# Patient Record
Sex: FEMALE | Race: BLACK OR AFRICAN AMERICAN | URBAN - METROPOLITAN AREA
[De-identification: names, ages, dates, MRNs, and addresses within clinical notes are randomized per-mention and may not be internally consistent; named-entity substitution may affect disease eponyms.]

---

## 2017-01-20 ENCOUNTER — HOSPITAL ENCOUNTER (OUTPATIENT)
Dept: MEDSURG UNIT | Facility: HOSPITAL | Age: 67
End: 2017-01-22
Attending: FAMILY MEDICINE | Admitting: FAMILY MEDICINE

## 2017-01-23 ENCOUNTER — HISTORICAL (OUTPATIENT)
Dept: LAB | Facility: HOSPITAL | Age: 67
End: 2017-01-23

## 2017-03-28 ENCOUNTER — HISTORICAL (OUTPATIENT)
Dept: FAMILY MEDICINE | Facility: CLINIC | Age: 67
End: 2017-03-28

## 2017-03-30 ENCOUNTER — HISTORICAL (OUTPATIENT)
Dept: ADMINISTRATIVE | Facility: HOSPITAL | Age: 67
End: 2017-03-30

## 2017-04-27 ENCOUNTER — HISTORICAL (OUTPATIENT)
Dept: LAB | Facility: HOSPITAL | Age: 67
End: 2017-04-27

## 2017-06-19 ENCOUNTER — HISTORICAL (OUTPATIENT)
Dept: LAB | Facility: HOSPITAL | Age: 67
End: 2017-06-19

## 2017-06-25 LAB — FINAL CULTURE: NORMAL

## 2017-07-11 ENCOUNTER — HISTORICAL (OUTPATIENT)
Dept: ADMINISTRATIVE | Facility: HOSPITAL | Age: 67
End: 2017-07-11

## 2017-07-11 LAB
BUN SERPL-MCNC: 63 MG/DL (ref 7–18)
CALCIUM SERPL-MCNC: 8.6 MG/DL (ref 8.5–10.1)
CHLORIDE SERPL-SCNC: 104 MMOL/L (ref 98–107)
CO2 SERPL-SCNC: 25 MMOL/L (ref 21–32)
CREAT SERPL-MCNC: 3.76 MG/DL (ref 0.55–1.02)
GLUCOSE SERPL-MCNC: 126 MG/DL (ref 74–106)
POTASSIUM SERPL-SCNC: 4.5 MMOL/L (ref 3.5–5.1)
SODIUM SERPL-SCNC: 138 MMOL/L (ref 136–145)

## 2017-07-17 ENCOUNTER — HISTORICAL (OUTPATIENT)
Dept: INTENSIVE CARE | Facility: HOSPITAL | Age: 67
End: 2017-07-17

## 2017-07-17 LAB
ABS NEUT (OLG): 6.57 X10(3)/MCL (ref 2.1–9.2)
APTT PPP: 32.2 SECOND(S) (ref 20.6–36)
BUN SERPL-MCNC: 64 MG/DL (ref 7–18)
CALCIUM SERPL-MCNC: 8.7 MG/DL (ref 8.5–10.1)
CHLORIDE SERPL-SCNC: 102 MMOL/L (ref 98–107)
CO2 SERPL-SCNC: 20 MMOL/L (ref 21–32)
CREAT SERPL-MCNC: 4.11 MG/DL (ref 0.55–1.02)
ERYTHROCYTE [DISTWIDTH] IN BLOOD BY AUTOMATED COUNT: 16.9 % (ref 11.5–17)
GLUCOSE SERPL-MCNC: 272 MG/DL (ref 74–106)
HCT VFR BLD AUTO: 30.1 % (ref 37–47)
HGB BLD-MCNC: 10.3 GM/DL (ref 12–16)
INR PPP: 1.55 (ref 0–1.27)
LYMPHOCYTES # BLD AUTO: 0.3 X10(3)/MCL (ref 0.6–4.6)
LYMPHOCYTES NFR BLD AUTO: 4 %
MCH RBC QN AUTO: 27.3 PG (ref 27–31)
MCHC RBC AUTO-ENTMCNC: 34.2 GM/DL (ref 33–36)
MCV RBC AUTO: 79.8 FL (ref 80–94)
MONOCYTES # BLD AUTO: 0.1 X10(3)/MCL (ref 0.1–1.3)
MONOCYTES NFR BLD AUTO: 1 %
NEUTROPHILS # BLD AUTO: 6.57 X10(3)/MCL (ref 2.1–9.2)
NEUTROPHILS NFR BLD AUTO: 94 %
PLATELET # BLD AUTO: 167 X10(3)/MCL (ref 130–400)
PMV BLD AUTO: 11.7 FL (ref 9.4–12.4)
POTASSIUM SERPL-SCNC: 4.4 MMOL/L (ref 3.5–5.1)
PROTHROMBIN TIME: 18.3 SECOND(S) (ref 12.1–14.2)
RBC # BLD AUTO: 3.77 X10(6)/MCL (ref 4.2–5.4)
SODIUM SERPL-SCNC: 141 MMOL/L (ref 136–145)
WBC # SPEC AUTO: 7 X10(3)/MCL (ref 4.5–11.5)

## 2017-07-18 LAB
ABS NEUT (OLG): 7.08 X10(3)/MCL (ref 2.1–9.2)
BUN SERPL-MCNC: 67 MG/DL (ref 7–18)
CALCIUM SERPL-MCNC: 8 MG/DL (ref 8.5–10.1)
CHLORIDE SERPL-SCNC: 105 MMOL/L (ref 98–107)
CO2 SERPL-SCNC: 22 MMOL/L (ref 21–32)
CREAT SERPL-MCNC: 3.95 MG/DL (ref 0.55–1.02)
ERYTHROCYTE [DISTWIDTH] IN BLOOD BY AUTOMATED COUNT: 16.8 % (ref 11.5–17)
GLUCOSE SERPL-MCNC: 249 MG/DL (ref 74–106)
HCT VFR BLD AUTO: 28.6 % (ref 37–47)
HGB BLD-MCNC: 9.8 GM/DL (ref 12–16)
LYMPHOCYTES # BLD AUTO: 0.3 X10(3)/MCL (ref 0.6–4.6)
LYMPHOCYTES NFR BLD AUTO: 4 %
MCH RBC QN AUTO: 27.1 PG (ref 27–31)
MCHC RBC AUTO-ENTMCNC: 34.3 GM/DL (ref 33–36)
MCV RBC AUTO: 79.2 FL (ref 80–94)
MONOCYTES # BLD AUTO: 0.2 X10(3)/MCL (ref 0.1–1.3)
MONOCYTES NFR BLD AUTO: 3 %
NEUTROPHILS # BLD AUTO: 7.08 X10(3)/MCL (ref 2.1–9.2)
NEUTROPHILS NFR BLD AUTO: 92 %
PLATELET # BLD AUTO: 179 X10(3)/MCL (ref 130–400)
PMV BLD AUTO: 12.7 FL (ref 9.4–12.4)
POTASSIUM SERPL-SCNC: 4.1 MMOL/L (ref 3.5–5.1)
RBC # BLD AUTO: 3.61 X10(6)/MCL (ref 4.2–5.4)
SODIUM SERPL-SCNC: 140 MMOL/L (ref 136–145)
WBC # SPEC AUTO: 7.6 X10(3)/MCL (ref 4.5–11.5)

## 2017-08-15 ENCOUNTER — HISTORICAL (OUTPATIENT)
Dept: ADMINISTRATIVE | Facility: HOSPITAL | Age: 67
End: 2017-08-15

## 2017-11-01 ENCOUNTER — HISTORICAL (OUTPATIENT)
Dept: ADMINISTRATIVE | Facility: HOSPITAL | Age: 67
End: 2017-11-01

## 2017-11-01 LAB
ABS NEUT (OLG): 4.4 X10(3)/MCL (ref 2.1–9.2)
BASOPHILS # BLD AUTO: 0 X10(3)/MCL (ref 0–0.2)
BASOPHILS NFR BLD AUTO: 1 %
BUN SERPL-MCNC: 48 MG/DL (ref 7–18)
CALCIUM SERPL-MCNC: 8 MG/DL (ref 8.5–10.1)
CHLORIDE SERPL-SCNC: 109 MMOL/L (ref 98–107)
CO2 SERPL-SCNC: 18 MMOL/L (ref 21–32)
CREAT SERPL-MCNC: 6.05 MG/DL (ref 0.55–1.02)
EOSINOPHIL # BLD AUTO: 0.1 X10(3)/MCL (ref 0–0.9)
EOSINOPHIL NFR BLD AUTO: 1 %
ERYTHROCYTE [DISTWIDTH] IN BLOOD BY AUTOMATED COUNT: 18.5 % (ref 11.5–17)
GLUCOSE SERPL-MCNC: 178 MG/DL (ref 74–106)
GROUP & RH: NORMAL
HCT VFR BLD AUTO: 28.5 % (ref 37–47)
HGB BLD-MCNC: 9.4 GM/DL (ref 12–16)
LYMPHOCYTES # BLD AUTO: 1 X10(3)/MCL (ref 0.6–4.6)
LYMPHOCYTES NFR BLD AUTO: 17 %
MCH RBC QN AUTO: 26.7 PG (ref 27–31)
MCHC RBC AUTO-ENTMCNC: 33 GM/DL (ref 33–36)
MCV RBC AUTO: 81 FL (ref 80–94)
MONOCYTES # BLD AUTO: 0.6 X10(3)/MCL (ref 0.1–1.3)
MONOCYTES NFR BLD AUTO: 10 %
NEUTROPHILS # BLD AUTO: 4.4 X10(3)/MCL (ref 1.4–7.9)
NEUTROPHILS NFR BLD AUTO: 71 %
PLATELET # BLD AUTO: 160 X10(3)/MCL (ref 130–400)
PMV BLD AUTO: 10.1 FL (ref 9.4–12.4)
POTASSIUM SERPL-SCNC: 5.3 MMOL/L (ref 3.5–5.1)
RBC # BLD AUTO: 3.52 X10(6)/MCL (ref 4.2–5.4)
SODIUM SERPL-SCNC: 141 MMOL/L (ref 136–145)
WBC # SPEC AUTO: 6.2 X10(3)/MCL (ref 4.5–11.5)

## 2017-11-15 ENCOUNTER — HISTORICAL (OUTPATIENT)
Dept: ADMINISTRATIVE | Facility: HOSPITAL | Age: 67
End: 2017-11-15

## 2017-11-15 LAB
BUN SERPL-MCNC: 21 MG/DL (ref 7–18)
CALCIUM SERPL-MCNC: 8.5 MG/DL (ref 8.5–10.1)
CHLORIDE SERPL-SCNC: 102 MMOL/L (ref 98–107)
CO2 SERPL-SCNC: 20 MMOL/L (ref 21–32)
CREAT SERPL-MCNC: 3.64 MG/DL (ref 0.55–1.02)
GLUCOSE SERPL-MCNC: 145 MG/DL (ref 74–106)
POTASSIUM SERPL-SCNC: 4.8 MMOL/L (ref 3.5–5.1)
SODIUM SERPL-SCNC: 134 MMOL/L (ref 136–145)

## 2017-11-24 ENCOUNTER — HISTORICAL (OUTPATIENT)
Dept: ADMINISTRATIVE | Facility: HOSPITAL | Age: 67
End: 2017-11-24

## 2017-11-24 LAB
ABS NEUT (OLG): 4.79
BASOPHILS # BLD AUTO: 0.02 X10(3)/MCL
BASOPHILS NFR BLD AUTO: 0.3 %
EOSINOPHIL # BLD AUTO: 0.14 X10(3)/MCL
EOSINOPHIL NFR BLD AUTO: 2 %
ERYTHROCYTE [DISTWIDTH] IN BLOOD BY AUTOMATED COUNT: 18 %
HCT VFR BLD AUTO: 29 % (ref 34–46)
HGB BLD-MCNC: 9.8 GM/DL (ref 11.3–15.4)
IMM GRANULOCYTES # BLD AUTO: 0.01 10*3/UL (ref 0–0.1)
IMM GRANULOCYTES NFR BLD AUTO: 0.1 % (ref 0–1)
LYMPHOCYTES # BLD AUTO: 1.15 X10(3)/MCL
LYMPHOCYTES NFR BLD AUTO: 16.4 %
MCH RBC QN AUTO: 26.4 PG (ref 27–33)
MCHC RBC AUTO-ENTMCNC: 33.8 GM/DL (ref 32–35)
MCV RBC AUTO: 78.2 FL (ref 81–97)
MONOCYTES # BLD AUTO: 0.89 X10(3)/MCL
MONOCYTES NFR BLD AUTO: 12.7 %
NEUTROPHILS # BLD AUTO: 4.79 X10(3)/MCL
NEUTROPHILS NFR BLD AUTO: 68.5 %
PLATELET # BLD AUTO: 210 X10(3)/MCL (ref 151–368)
PMV BLD AUTO: 11 FL
RBC # BLD AUTO: 3.71 X10(6)/MCL (ref 3.9–5)
WBC # SPEC AUTO: 7 X10(3)/MCL (ref 3.4–9.2)

## 2017-12-14 ENCOUNTER — HISTORICAL (OUTPATIENT)
Dept: LAB | Facility: HOSPITAL | Age: 67
End: 2017-12-14

## 2017-12-14 LAB
ABS NEUT (OLG): 4.26 X10(3)/MCL (ref 2.1–9.2)
ALBUMIN SERPL-MCNC: 3.2 GM/DL (ref 3.4–5)
ALBUMIN/GLOB SERPL: 0.8 {RATIO}
ALP SERPL-CCNC: 134 UNIT/L (ref 38–126)
ALT SERPL-CCNC: 12 UNIT/L (ref 12–78)
AST SERPL-CCNC: 16 UNIT/L (ref 15–37)
BASOPHILS # BLD AUTO: 0 X10(3)/MCL (ref 0–0.2)
BASOPHILS NFR BLD AUTO: 0 %
BILIRUB SERPL-MCNC: 1.7 MG/DL (ref 0.2–1)
BILIRUBIN DIRECT+TOT PNL SERPL-MCNC: 0.8 MG/DL (ref 0–0.2)
BILIRUBIN DIRECT+TOT PNL SERPL-MCNC: 0.9 MG/DL (ref 0–0.8)
BUN SERPL-MCNC: 5 MG/DL (ref 7–18)
CALCIUM SERPL-MCNC: 8.5 MG/DL (ref 8.5–10.1)
CHLORIDE SERPL-SCNC: 105 MMOL/L (ref 98–107)
CHOLEST SERPL-MCNC: 100 MG/DL (ref 0–200)
CHOLEST/HDLC SERPL: 1.4 {RATIO} (ref 0–4)
CO2 SERPL-SCNC: 23 MMOL/L (ref 21–32)
CREAT SERPL-MCNC: 2.32 MG/DL (ref 0.55–1.02)
DEPRECATED CALCIDIOL+CALCIFEROL SERPL-MC: 39.73 NG/ML (ref 30–80)
EOSINOPHIL # BLD AUTO: 0.1 X10(3)/MCL (ref 0–0.9)
EOSINOPHIL NFR BLD AUTO: 2 %
ERYTHROCYTE [DISTWIDTH] IN BLOOD BY AUTOMATED COUNT: 19.2 % (ref 11.5–17)
EST. AVERAGE GLUCOSE BLD GHB EST-MCNC: 163 MG/DL
GLOBULIN SER-MCNC: 3.9 GM/DL (ref 2.4–3.5)
GLUCOSE SERPL-MCNC: 80 MG/DL (ref 74–106)
HBA1C MFR BLD: 7.3 % (ref 4.2–6.3)
HCT VFR BLD AUTO: 34.9 % (ref 37–47)
HDLC SERPL-MCNC: 69 MG/DL (ref 35–60)
HGB BLD-MCNC: 11.5 GM/DL (ref 12–16)
IRON SATN MFR SERPL: 56.1 % (ref 20–50)
IRON SERPL-MCNC: 111 MCG/DL (ref 50–175)
LDLC SERPL CALC-MCNC: 17 MG/DL (ref 0–129)
LYMPHOCYTES # BLD AUTO: 1 X10(3)/MCL (ref 0.6–4.6)
LYMPHOCYTES NFR BLD AUTO: 16 %
MCH RBC QN AUTO: 26 PG (ref 27–31)
MCHC RBC AUTO-ENTMCNC: 33 GM/DL (ref 33–36)
MCV RBC AUTO: 78.8 FL (ref 80–94)
MONOCYTES # BLD AUTO: 0.6 X10(3)/MCL (ref 0.1–1.3)
MONOCYTES NFR BLD AUTO: 10 %
NEUTROPHILS # BLD AUTO: 4.26 X10(3)/MCL (ref 2.1–9.2)
NEUTROPHILS NFR BLD AUTO: 72 %
PLATELET # BLD AUTO: 197 X10(3)/MCL (ref 130–400)
PMV BLD AUTO: 9.5 FL (ref 9.4–12.4)
POTASSIUM SERPL-SCNC: 3.3 MMOL/L (ref 3.5–5.1)
PROT SERPL-MCNC: 7.1 GM/DL (ref 6.4–8.2)
RBC # BLD AUTO: 4.43 X10(6)/MCL (ref 4.2–5.4)
SODIUM SERPL-SCNC: 137 MMOL/L (ref 136–145)
TIBC SERPL-MCNC: 198 MCG/DL (ref 250–450)
TRANSFERRIN SERPL-MCNC: 172 MG/DL (ref 200–360)
TRIGL SERPL-MCNC: 71 MG/DL (ref 30–150)
TSH SERPL-ACNC: 1.57 MIU/ML (ref 0.36–3.74)
URATE SERPL-MCNC: 1.7 MG/DL (ref 2.6–7.2)
VLDLC SERPL CALC-MCNC: 14 MG/DL
WBC # SPEC AUTO: 6 X10(3)/MCL (ref 4.5–11.5)

## 2017-12-20 ENCOUNTER — HISTORICAL (OUTPATIENT)
Dept: ADMINISTRATIVE | Facility: HOSPITAL | Age: 67
End: 2017-12-20

## 2017-12-20 LAB
ABS NEUT (OLG): 3.96 X10(3)/MCL (ref 2.1–9.2)
BASOPHILS # BLD AUTO: 0 X10(3)/MCL (ref 0–0.2)
BASOPHILS NFR BLD AUTO: 0 %
BUN SERPL-MCNC: 15 MG/DL (ref 7–18)
CALCIUM SERPL-MCNC: 8.9 MG/DL (ref 8.5–10.1)
CHLORIDE SERPL-SCNC: 108 MMOL/L (ref 98–107)
CO2 SERPL-SCNC: 24 MMOL/L (ref 21–32)
CREAT SERPL-MCNC: 3.79 MG/DL (ref 0.55–1.02)
EOSINOPHIL # BLD AUTO: 0.1 X10(3)/MCL (ref 0–0.9)
EOSINOPHIL NFR BLD AUTO: 2 %
ERYTHROCYTE [DISTWIDTH] IN BLOOD BY AUTOMATED COUNT: 19.6 % (ref 11.5–17)
GLUCOSE SERPL-MCNC: 99 MG/DL (ref 74–106)
HCT VFR BLD AUTO: 36.5 % (ref 37–47)
HGB BLD-MCNC: 12 GM/DL (ref 12–16)
LYMPHOCYTES # BLD AUTO: 1.2 X10(3)/MCL (ref 0.6–4.6)
LYMPHOCYTES NFR BLD AUTO: 20 %
MCH RBC QN AUTO: 26 PG (ref 27–31)
MCHC RBC AUTO-ENTMCNC: 32.9 GM/DL (ref 33–36)
MCV RBC AUTO: 79.2 FL (ref 80–94)
MONOCYTES # BLD AUTO: 0.6 X10(3)/MCL (ref 0.1–1.3)
MONOCYTES NFR BLD AUTO: 10 %
NEUTROPHILS # BLD AUTO: 3.96 X10(3)/MCL (ref 2.1–9.2)
NEUTROPHILS NFR BLD AUTO: 67 %
PLATELET # BLD AUTO: 189 X10(3)/MCL (ref 130–400)
PMV BLD AUTO: 9.8 FL (ref 9.4–12.4)
POTASSIUM SERPL-SCNC: 3.8 MMOL/L (ref 3.5–5.1)
RBC # BLD AUTO: 4.61 X10(6)/MCL (ref 4.2–5.4)
SODIUM SERPL-SCNC: 136 MMOL/L (ref 136–145)
WBC # SPEC AUTO: 5.9 X10(3)/MCL (ref 4.5–11.5)

## 2017-12-26 ENCOUNTER — HISTORICAL (OUTPATIENT)
Dept: MEDSURG UNIT | Facility: HOSPITAL | Age: 67
End: 2017-12-26

## 2017-12-26 LAB
ABS NEUT (OLG): 5.64 X10(3)/MCL (ref 2.1–9.2)
ANION GAP SERPL CALC-SCNC: 17 MMOL/L
APTT PPP: 32.6 SECOND(S) (ref 24.8–36.9)
BASOPHILS # BLD AUTO: 0 X10(3)/MCL (ref 0–0.2)
BASOPHILS NFR BLD AUTO: 0 %
BNP BLD-MCNC: 3872 PG/ML (ref 0–125)
BUN SERPL-MCNC: 44 MG/DL (ref 7–18)
CHLORIDE SERPL-SCNC: 105 MMOL/L (ref 98–109)
CREAT SERPL-MCNC: 6.7 MG/DL (ref 0.6–1.3)
EOSINOPHIL # BLD AUTO: 0.1 X10(3)/MCL (ref 0–0.9)
EOSINOPHIL NFR BLD AUTO: 1 %
ERYTHROCYTE [DISTWIDTH] IN BLOOD BY AUTOMATED COUNT: 18.8 % (ref 11.5–17)
GLUCOSE SERPL-MCNC: 158 MG/DL (ref 70–105)
HCT VFR BLD AUTO: 37 % (ref 37–47)
HCT VFR BLD CALC: 42 % (ref 38–51)
HGB BLD-MCNC: 12.2 GM/DL (ref 12–16)
HGB BLD-MCNC: 14.3 MG/DL (ref 12–17)
INR PPP: 1.2 (ref 0–1.27)
LYMPHOCYTES # BLD AUTO: 1.1 X10(3)/MCL (ref 0.6–4.6)
LYMPHOCYTES NFR BLD AUTO: 14 %
MCH RBC QN AUTO: 26.2 PG (ref 27–31)
MCHC RBC AUTO-ENTMCNC: 33 GM/DL (ref 33–36)
MCV RBC AUTO: 79.6 FL (ref 80–94)
MONOCYTES # BLD AUTO: 0.7 X10(3)/MCL (ref 0.1–1.3)
MONOCYTES NFR BLD AUTO: 9 %
NEUTROPHILS # BLD AUTO: 5.64 X10(3)/MCL (ref 1.4–7.9)
NEUTROPHILS NFR BLD AUTO: 75 %
PLATELET # BLD AUTO: 176 X10(3)/MCL (ref 130–400)
PMV BLD AUTO: 9.7 FL (ref 9.4–12.4)
POC IONIZED CALCIUM: 1.14 MMOL/L (ref 1.12–1.32)
POC TCO2: 23 MMOL/L (ref 22–27)
POTASSIUM BLD-SCNC: 3.6 MMOL/L (ref 3.5–4.9)
PROTHROMBIN TIME: 15.6 SECOND(S) (ref 12.2–14.7)
RBC # BLD AUTO: 4.65 X10(6)/MCL (ref 4.2–5.4)
SODIUM BLD-SCNC: 141 MMOL/L (ref 138–146)
WBC # SPEC AUTO: 7.5 X10(3)/MCL (ref 4.5–11.5)

## 2018-01-12 ENCOUNTER — HISTORICAL (OUTPATIENT)
Dept: SURGERY | Facility: HOSPITAL | Age: 68
End: 2018-01-12

## 2018-01-12 LAB
ABS NEUT (OLG): 4.75 X10(3)/MCL (ref 2.1–9.2)
BASOPHILS # BLD AUTO: 0.03 X10(3)/MCL (ref 0–0.2)
BASOPHILS NFR BLD AUTO: 0.4 % (ref 0–1)
BUN SERPL-MCNC: 56 MG/DL (ref 7–18)
CALCIUM SERPL-MCNC: 8.8 MG/DL (ref 8.5–10.1)
CHLORIDE SERPL-SCNC: 108 MMOL/L (ref 98–107)
CO2 SERPL-SCNC: 19 MMOL/L (ref 21–32)
CREAT SERPL-MCNC: 9.98 MG/DL (ref 0.55–1.02)
EOSINOPHIL # BLD AUTO: 0.1 X10(3)/MCL (ref 0–0.9)
EOSINOPHIL NFR BLD AUTO: 1.4 % (ref 0–6.4)
ERYTHROCYTE [DISTWIDTH] IN BLOOD BY AUTOMATED COUNT: 18.2 % (ref 11.5–17)
GLUCOSE SERPL-MCNC: 182 MG/DL (ref 74–106)
HCT VFR BLD AUTO: 34 % (ref 37–47)
HGB BLD-MCNC: 11.5 GM/DL (ref 12–16)
IMM GRANULOCYTES # BLD AUTO: 0.02 10*3/UL (ref 0–0.02)
IMM GRANULOCYTES NFR BLD AUTO: 0.3 % (ref 0–0.43)
LYMPHOCYTES # BLD AUTO: 1.49 X10(3)/MCL (ref 0.6–4.6)
LYMPHOCYTES NFR BLD AUTO: 21.4 % (ref 16–44)
MCH RBC QN AUTO: 26 PG (ref 27–31)
MCHC RBC AUTO-ENTMCNC: 33.8 GM/DL (ref 33–36)
MCV RBC AUTO: 76.7 FL (ref 80–94)
MONOCYTES # BLD AUTO: 0.56 X10(3)/MCL (ref 0.1–1.3)
MONOCYTES NFR BLD AUTO: 8.1 % (ref 4–12.1)
NEUTROPHILS # BLD AUTO: 4.75 X10(3)/MCL (ref 2.1–9.2)
NEUTROPHILS NFR BLD AUTO: 68.4 % (ref 43–73)
NRBC BLD AUTO-RTO: 0 % (ref 0–0.2)
PLATELET # BLD AUTO: 242 X10(3)/MCL (ref 130–400)
PMV BLD AUTO: 10.6 FL (ref 7.4–10.4)
POTASSIUM SERPL-SCNC: 3.9 MMOL/L (ref 3.5–5.1)
RBC # BLD AUTO: 4.43 X10(6)/MCL (ref 4.2–5.4)
SODIUM SERPL-SCNC: 143 MMOL/L (ref 136–145)
WBC # SPEC AUTO: 7 X10(3)/MCL (ref 4.5–11.5)

## 2018-02-14 ENCOUNTER — HISTORICAL (OUTPATIENT)
Dept: ADMINISTRATIVE | Facility: HOSPITAL | Age: 68
End: 2018-02-14

## 2018-02-14 LAB
ABS NEUT (OLG): 3.39 X10(3)/MCL (ref 2.1–9.2)
BASOPHILS # BLD AUTO: 0 X10(3)/MCL (ref 0–0.2)
BASOPHILS NFR BLD AUTO: 1 %
BUN SERPL-MCNC: 40 MG/DL (ref 7–18)
CALCIUM SERPL-MCNC: 8.2 MG/DL (ref 8.5–10.1)
CHLORIDE SERPL-SCNC: 112 MMOL/L (ref 98–107)
CO2 SERPL-SCNC: 17 MMOL/L (ref 21–32)
CREAT SERPL-MCNC: 5.72 MG/DL (ref 0.55–1.02)
EOSINOPHIL # BLD AUTO: 0.2 X10(3)/MCL (ref 0–0.9)
EOSINOPHIL NFR BLD AUTO: 4 %
ERYTHROCYTE [DISTWIDTH] IN BLOOD BY AUTOMATED COUNT: 16.6 % (ref 11.5–17)
GLUCOSE SERPL-MCNC: 120 MG/DL (ref 74–106)
HCT VFR BLD AUTO: 33.8 % (ref 37–47)
HGB BLD-MCNC: 11.7 GM/DL (ref 12–16)
LYMPHOCYTES # BLD AUTO: 1.1 X10(3)/MCL (ref 0.6–4.6)
LYMPHOCYTES NFR BLD AUTO: 21 %
MCH RBC QN AUTO: 26.4 PG (ref 27–31)
MCHC RBC AUTO-ENTMCNC: 34.6 GM/DL (ref 33–36)
MCV RBC AUTO: 76.1 FL (ref 80–94)
MONOCYTES # BLD AUTO: 0.6 X10(3)/MCL (ref 0.1–1.3)
MONOCYTES NFR BLD AUTO: 12 %
NEUTROPHILS # BLD AUTO: 3.39 X10(3)/MCL (ref 2.1–9.2)
NEUTROPHILS NFR BLD AUTO: 63 %
PLATELET # BLD AUTO: 179 X10(3)/MCL (ref 130–400)
PMV BLD AUTO: 10.1 FL (ref 9.4–12.4)
POTASSIUM SERPL-SCNC: 4.2 MMOL/L (ref 3.5–5.1)
RBC # BLD AUTO: 4.44 X10(6)/MCL (ref 4.2–5.4)
SODIUM SERPL-SCNC: 141 MMOL/L (ref 136–145)
WBC # SPEC AUTO: 5.4 X10(3)/MCL (ref 4.5–11.5)

## 2018-03-28 ENCOUNTER — HISTORICAL (OUTPATIENT)
Dept: LAB | Facility: HOSPITAL | Age: 68
End: 2018-03-28

## 2018-03-31 LAB
FINAL CULTURE: NORMAL
FINAL CULTURE: NORMAL

## 2022-04-30 NOTE — CONSULTS
DATE OF CONSULTATION:  11/15/2017    ATTENDING PHYSICIAN:  Dr. Kelton Recio MD  CONSULTING PHYSICIAN:  Kelton Recio MD    REASON FOR CONSULTATION:  Purulent discharge from chest wall.    HISTORY OF PRESENT ILLNESS:  This is a 66-year-old female, who earlier in October was admitted with anasarca, congestive heart failure, poor urine output despite aggressive medical management for which she underwent placement of temporary groin catheter and was dialyzed for several days in a row removing about 60 pounds of water weight.   On 10/5/17, I inserted a tunneled hemocatheter and she has been since then in hemodialysis three times a week.  Earlier this morning, Dr. Coto repaired this catheter due to poor function.  Yesterday, I was called by the dialysis nurses requesting to have the catheter replaced on the basis of discharge from chest wall at the site of the catheter.    PAST MEDICAL HISTORY:  Positive for Klebsiella pneumoniae, systolic heart failure status post AICD with ejection fraction 23%, diabetes mellitus, obesity, peripheral vascular disease with diabetic foot wounds status post partial amputation in July of 2017, malnutrition, GI bleeding, hemorrhoids, hypothyroidism, thrombocytopenia.    PAST SURGICAL HISTORY:  Cholecystectomy, AICD placement, dialysis catheter insertion.    ALLERGIES:  Iodine.    FAMILY HISTORY:  Noncontributory.    SOCIAL HISTORY:  The patient now is a nursing home resident.  No exposure to alcohol or tobacco.    PHYSICAL EXAMINATION:  GENERAL:   Awake and alert with some confusion in time, but able to identify things around her.     VITAL SIGNS:  Afebrile.     HEENT/NECK: Catheter on right side.   CHEST:  Left infraclavicular implanted device (IACD).  On the right side, there is a partially dislodged catheter with long segment outside of the skin.  There is some purulent discharge that seems to be at the exit site of the catheter where the skin stitches were.     ABDOMEN:   Large and soft.   EXTREMITIES:  Dressings covering the right foot.    LABORATORY DATA:  Sodium 134, potassium 4.8, chloride 102, CO2 20, calcium 8.5, glucose 145, BUN 21 and creatinine 3.6.    IMPRESSION:  Infected hemodialysis catheter partially dislodged.    RECOMMENDATIONS:  Removal of partially dislodged infected catheter and insertion of a new one.    NOTE:  The patient has pacemaker/defibrillator on the left side.  Will try to pass the new catheter on the right side with a different exit site probably close to the sternal border.        ______________________________  Kelton Recio MD JRP/CD  DD:  11/15/2017  Time:  02:19PM  DT:  11/15/2017  Time:  02:51PM  Job #:  [id number]

## 2022-04-30 NOTE — OP NOTE
DATE OF SURGERY:    11/15/2017    SURGEON:  Kelton Recio MD    PREOPERATIVE DIAGNOSIS:  Dislodged, infected hemocatheter.    POSTOPERATIVE DIAGNOSIS:  Dislodged, infected hemocatheter.    ANESTHESIA:  Local plus MAC.    PROCEDURE:    1. Removal of dislodged catheter.  2. Insertion of new Palindrome catheter 23 cm from tip to cuff.    PROCEDURE IN DETAIL:  Upper chest and neck were prepped and draped.  Xylocaine was infiltrated in soft tissues.  Cutdown was down using electrocautery in supraclavicular area.  The catheter was located, observing under x-ray that the tip was just in the innominate vein.  I transected, passed the guide wire and removed the rest of the catheter.  Palindrome catheter 23-cm from tip to cuff was inserted and advanced until reaching the right atrium.  The distal end of the catheter was exteriorized in the right chest, a couple of centimeters lateral to the sternal border, affixed to the skin with two stitches of 2-0 silk.  Lumen of the catheter packed with heparin 1000 unit per mL and a small incision on the neck repaired with Vicryl.        ______________________________  MD MERLENE Friedman/CD  DD:  11/15/2017  Time:  02:22PM  DT:  11/16/2017  Time:  01:49PM  Job #:  [id number]

## 2022-04-30 NOTE — CONSULTS
Patient:   Loida Meng            MRN: [id number]            FIN: [id number]               Age:   67 years     Sex:  Female     :  1950   Associated Diagnoses:   None   Author:   Keven SKELTON MD, Camilo Wen      History of Present Illness   68 y/o female with h/o HTN/HLP/CHF/COPD/ESRD presented to [facility] ED wityh complaint thst her HD cath fell out at home.  She had cath and LUE B-C fistula placed by Dr. Coto and undergoes HD T/Th/Sat.  Denies chest pain or SOB.  Her cath had functioned without issues prior to falling out.      Review of Systems   Constitutional:  No fever, No chills.    Eye:  No recent visual problem.    Ear/Nose/Mouth/Throat:  No decreased hearing.    Respiratory:  No shortness of breath, No cough.    Cardiovascular:  No chest pain.    Gastrointestinal:  No nausea, No vomiting.    Genitourinary:  No dysuria.    Hematology/Lymphatics:  No bruising tendency.    Integumentary:  No rash.    Neurologic:  Alert and oriented X4.       Histories   PMH - HTN, HLP, ESRD, CHF COPD  [facility] - tunneled cath, RUE B-C fistula, AICD  Social - denies tobacco  Family - noncontributory      Physical Examination   WDWN female in NAD  AAO x 4  EOMI  CTA B  RRR, nl S1S2  soft, NT/ND, +BS  RUE fistula with +thill  diminished radial pulse      Impression and Plan   68 y/o female with ESRD  -Pt doing well.  Her tunneled cath fell out at home and requires replacement. Will plan for this in the OR today.  Procedure along with risks discussed.  She plans for HD tomorrow at her center.

## 2022-04-30 NOTE — OP NOTE
Patient:   Loida Meng            MRN: [id number]            FIN: [id number]               Age:   67 years     Sex:  Female     :  1950   Associated Diagnoses:   End stage renal disease on dialysis   Author:   Grisel Coto MD      Operative Note   Operative Information   Date/ Time:  2017 19:08:00.     Procedures Performed: Right brachiocephalic fistula   .     Indications: Mrs. Meng is a 66 y/o woman with ESRD who needs long term hemodialysis access creation.   Preoperative mapping suggests a suitable right arm cephalic vein..     Preoperative Diagnosis: End stage renal disease on dialysis ([id number] N18.6).     Postoperative Diagnosis: End stage renal disease on dialysis ([id number] N18.6).     Surgeon: Grisel Coto MD.     Assistant.     Speciman Removed: none   .     Esimated blood loss: loss less than  100  cc.     Description of Procedure/Findings/    Complications: The risks and benefits of the procedure were discussed with the patient prior to the procedure and the patient desires to proceed.   Her right arm was prepped in the usual sterile fashion.  Cefzol was administered prior to the incision.  An appropriate time out was performed.  Ultrasound was utlilized to identify the cephalic vein in the upper arm.  It was patent throughout and appropriate in size in the upper arm.   A curvilinear incision was made over the brachial artery and the cephalic vein just proximal to the antecubital fossa.   Dissection was performed through the superficial soft tissues and then followed the appropriate plane laterally and medially to identify the cephalic vein and brachial artery.  Each of these vessels were isolated and side branches were ligated with 4-0 silk when appropriate.  The cephalic vein was ligated distally with a 3-0 silk. The brachial artery had no disease and appeared appropriate for fistula creation (aside from being on the small size).  The cephalic vein easily  accepted a 4mm dilator and demonstrated a thrill with heparin saline injection.  4000 units of heparin was given IV.  A longitudinal incision was made on the brachial artery.  The vein was spatulated appropriately.  Anastamosis was created with a 6-0 prolene suture. Hemostasis was obtained and the soft tissue was dissected to allow for appropriate lay of the vein graft.  3-0 vicryl was utilized to close the subcutaneous structures and a 4-0 monocryl was utlilzed to close the skin. Dermabond was used to dress the wound.  There was a thrill to the fistula and a palpable right radial pulse at the wrist at case completion.  This completed the procedure.      .     Findings:    ,    .     Complications: None.

## 2022-04-30 NOTE — OP NOTE
DATE OF SURGERY:    08/15/2017    SURGEON:  Carlyle Rivera DPM    PREOPERATIVE DIAGNOSES:    1. Osteomyelitis, right fifth toe.  2. Chronic, nonhealing, ulceration, right fifth toe.    POSTOPERATIVE DIAGNOSES:    1. Osteomyelitis, right fifth toe.  2. Chronic, nonhealing, ulceration, right fifth toe.    OPERATION:  Amputation of the right fifth toe at the metatarsal pharyngeal joint level.    ANESTHESIA:  Local with monitored anesthesia care.    HEMOSTASIS:  A pneumatic ankle tourniquet.    ESTIMATED BLOOD LOSS:  Minimal.    MATERIALS:  None.    INJECTABLES:  10 cc of a 1:1 mixture of 1% lidocaine plain and 0.5% Marcaine plain.    PROCEDURE IN DETAIL:  Under mild sedation, the patient was brought into the operating room and placed on the operating table in supine position.  A pneumatic ankle tourniquet was then placed about the patient's right ankle.  Following IV sedation, local anesthesia was obtained about the right foot utilizing 10 cc of a 1:1 mixture of 1% lidocaine plain and 0.5% Marcaine plain.  The foot was then scrubbed, prepped and draped in the usual aseptic manner.  An Esmarch bandage was utilized to exsanguinate the patient's right foot and the pneumatic ankle tourniquet was inflated to 200 mmHg.     Attention was then directed to the fifth digit of the right foot at which time 2 converging, 2 cm, semi-elliptical longitudinal incisions were made around the 5th toe beginning at the dorsal 5th MPJ and extending to the fifth toe sulcus.  The incision was deepened down to bone and the periosteum and capsular structures were freed off the proximal phalanx back to the MPJ level, at which time the toe was disarticulated with care being taken not to damage the articular surface of the fifth metatarsal head.  The toe was then freed of any soft tissue attachments, removed in toto, passed from the operative field, and sent for pathology of the distal and proximal aspects of the fifth toe.  The wound  was then flushed with copious amounts of sterile normal saline.  The deeper structures were closed utilizing 4-0 Monocryl and skin closure was obtained utilizing 4-0 nylon in simple interrupted suture technique.     Upon completion of the procedure, the incision was dressed with Xeroform and covered with sterile compressive dressings consisting of 4 x 4's and Kerlix.  The pneumatic ankle tourniquet was then deflated.  An Ace wrap and postop shoe were then applied.     The patient tolerated the procedure and anesthesia well.  She was transferred to the recovery room with vital signs stable and vascular status intact to all remaining toes of the right foot.  Following a period of postoperative monitoring, the patient will be discharged home per Anesthesia.        ______________________________  BRENDEN Tamayo/UD  DD:  08/23/2017  Time:  04:58PM  DT:  08/24/2017  Time:  11:59AM  Job #:  [id number]

## 2022-04-30 NOTE — OP NOTE
DATE OF SURGERY:    12/26/2017    SURGEON:  Camilo Baca MD    PREOPERATIVE DIAGNOSIS:  End-stage renal disease.    POSTOPERATIVE DIAGNOSIS:  End-stage renal disease.    PROCEDURE:    1. Ultrasound guided access of left internal jugular vein.    2. Insertion of left internal jugular tunneled dialysis catheter (Palindrome 28 centimeters).    ANESTHESIA:  Local 1% lidocaine and MAC.    ESTIMATED BLOOD LOSS:  <10cc    COMPLICATIONS:  None.    INDICATION FOR PROCEDURE:  The patient is a 67-year-old female with end-stage renal disease who undergoes hemodialysis Tuesday, Thursday, Saturday.  She has a right upper extremity brachiocephalic fistula that had been by Dr. Coto, and this is not functioning at this time.  She also has a right internal jugular tunneled dialysis catheter placed by Dr. Coto, which reportedly fell out at home.  The patient is indicated for replacement catheter for dialysis access.    PROCEDURE IN DETAIL:  After informed consent was obtained, the patient was taken to the operating room and placed in supine position, prepped and draped in a sterile fashion.  We used ultrasound to identify the right internal jugular vein, and this was noted to be thrombosed and did not compress.  Her left internal jugular vein was patent and compressible.  These images were saved.  We accessed with an 18 gauge needle, followed by a wire via Seldinger technique.  We placed a wire in the inferior vena cava under fluoroscopic guidance.  We serially dilated this tract and ultimately placed a large dilator/peel-away introducer sheath.  Through this we placed the catheter with the tip at the cavoatrial junction.  The sheath was removed in standard peel-away fashion.  The catheter was tunneled to an appropriate site on the left chest wall.  The catheter was assembled in standard fashion.  The catheter flushed and matured easily.  The catheter was instilled with the appropriate amount of straight heparin.  The  catheter was secured to the left chest wall with nylon suture and the insertion site at the neck was closed with 4-0 Monocryl in subcuticular     fashion, followed by Dermabond and sterile dressing.  The patient tolerated the procedure well without any complications.  Patient was transferred in stable condition to recovery room.        ______________________________  MD WILLA Giron III/JAY  DD:  12/26/2017  Time:  06:22PM  DT:  12/26/2017  Time:  07:03PM  Job #:  [id number]

## 2022-04-30 NOTE — H&P
* Final Report *  Vasc Surg     Patient:   Loida Meng            MRN: [id number]            FIN: [id number]               Age:   67 years     Sex:  Female     :  1950   Associated Diagnoses:   None   Author:   Camilo Baca III, MD      History of Present Illness   68 y/o female with h/o HTN/HLP/CHF/COPD/ESRD presented to [facility] ED wityh complaint thst her HD cath fell out at home.  She had cath and LUE B-C fistula placed by Dr. Coto and undergoes HD T/Th/Sat.  Denies chest pain or SOB.  Her cath had functioned without issues prior to falling out.      Review of Systems   Constitutional:  No fever, No chills.    Eye:  No recent visual problem.    Ear/Nose/Mouth/Throat:  No decreased hearing.    Respiratory:  No shortness of breath, No cough.    Cardiovascular:  No chest pain.    Gastrointestinal:  No nausea, No vomiting.    Genitourinary:  No dysuria.    Hematology/Lymphatics:  No bruising tendency.    Integumentary:  No rash.    Neurologic:  Alert and oriented X4.       Histories   PMH - HTN, HLP, ESRD, CHF COPD  [facility] - tunneled cath, RUE B-C fistula, AICD  Social - denies tobacco  Family - noncontributory      Physical Examination   WDWN female in NAD  AAO x 4  EOMI  CTA B  RRR, nl S1S2  soft, NT/ND, +BS  RUE fistula with +thill  diminished radial pulse      Impression and Plan   68 y/o female with ESRD  -Pt doing well.  Her tunneled cath fell out at home and requires replacement. Will plan for this in the OR today.  Procedure along with risks discussed.  She plans for HD tomorrow at her center.     Result type: Consultation Note  Result date: 2017 17:38 CST  Result status: Auth (Verified)  Result title: Vasc Surg  Performed by: Camilo Baca III, MD on 2017 17:46 CST  Verified by: Camilo Baca III, MD on 2017 17:46 CST  Encounter info: [id number], [facility], Outpatient Bedded, 2017 - 2017    Document has been moved  by HIM Specialist.

## 2022-04-30 NOTE — OP NOTE
DATE OF SURGERY:    01/12/2018    SURGEON:  Kelton Recio MD    PREOPERATIVE DIAGNOSES:    1. Dislodged dialysis catheter.  2. End-stage renal disease.  3. Cardiac arrhythmia.  4. Diabetes mellitus.  5. Status post placement of pacemaker defibrillator.    POSTOPERATIVE DIAGNOSES:    1. Dislodged dialysis catheter.  2. End-stage renal disease.   3. Cardiac arrhythmia.  4. Diabetes mellitus.  5. Status post placement of pacemaker defibrillator.    ANESTHESIA:  Local plus MAC.    SURGERY PERFORMED:    1. Removal of dislodged hemo catheter.  2. Insertion of a tunneled hemo catheter via left internal jugular vein.    DESCRIPTION OF PROCEDURE:  Patient was brought in receiving antibiotics on call.  Time-out was completed.  The upper chest and neck were cleansed and draped noticing the partially dislodged catheter, identifying the cuff very close to the exit site almost going to the level of the skin.  Also, the pacemaker defibrillator was identified in the left infraclavicular area.       Xylocaine 1% was infiltrated in the soft tissues, then a small incision was done in the supraclavicular area, locating the entrance of the partially dislodged catheter that was removed through this entrance.  JT guidewire was advanced all the way down crossing the mediastinal, reaching the right atrium, and through multiple manipulations, reaching also the most upper part of the inferior vena cava.  On top of this wire, and using the peel-away introducer, Palindrome catheter 28 cm from tip to cuff was inserted, advancing the tip beyond the atrium and reaching the inferior vena cava.  Then the opposite end of the catheter was exteriorized through a long subcutaneous tunnel superior and parallel to the clavicle with the exit site in the deltoid area.  Imaging study showed that there was no kinking of the catheter.  The extensions were connected and the catheter affixed to the skin of the deltoid area with 2 stitches of 2-0 silk  and the small incision on the neck was repaired with Vicryl.     The cuff of the partially dislodged catheter was then detached from the subcutaneous tissue and it was pulled out.       Note, the patient has a fistula in the right cubital fossa that is not ready to be used for hemodialysis yet.        ______________________________  MD MERLENE Friedman/UK  DD:  01/12/2018  Time:  12:20PM  DT:  01/14/2018  Time:  01:01PM  Job #:  [id number]    cc: [facility]

## 2022-04-30 NOTE — OP NOTE
DATE OF SURGERY:    11/01/2017    SURGEON:  Grisel Coto MD    PREOPERATIVE DIAGNOSES:    1. End stage renal disease.   2. Mechanical complication of right chest wall tunneled dialysis catheter.    POSTOPERATIVE DIAGNOSES:    1. End stage renal disease.   2. Mechanical complication of right chest wall tunneled dialysis catheter.    PROCEDURE:  Exchange of right chest wall tunneled dialysis catheter over a wire.    HISTORY OF PRESENT ILLNESS:  This is a 67-year-old woman with end stage renal disease who has a right chest wall tunneled dialysis catheter that has not been functioning appropriately.  She has been unable to dialyze and it has been requested that we place a new catheter.  We intend to place a left upper extremity dialysis fistula today.    PROCEDURE IN DETAIL:  The patient was taken to the Operating Room, placed in supine position where her bilateral neck and chest walls were prepped and draped in the usual sterile fashion.  Monitored anesthesia care was initiated.   J-wire and Glidewire were placed through the previous tunnel catheter.  1% lidocaine was injected around the tunnel catheter exit site.  The cuff was freed with blunt dissection and then was removed over dual wire system.  We then placed a new retrograde tunneled 23 cm Palindrome catheter as we did not have a 23 cm antegrade Palindrome catheter.  We placed a catheter into the SVC and removed our wires appropriately, transected the catheter and placed ports and hubs.  I attempted to aspirate from each lumen, but had some difficulty with advancement of the catheter.  There was somewhat better flows.  This did leave the cuff in a very high position.  Ultimately, I felt that the catheter tip would do better with the tip positioned more further into the right atrium and with the tip actually in the right atrium.  Glidewire was placed back through the catheter. This catheter was removed and an antegrade 28 cm Palindrome catheter was  placed without difficulty.  Both lumens easily aspirated and flushed heparin saline solution and were locked with 1000 unit per ml heparin solution.  Appropriate caps were placed.  The catheter was affixed to the chest wall with 2-0 silk suture.  Another 2-0 silk suture was utilized as a pursestring around the catheter exit site.  Fluoroscopy was utilized for appropriate positioning of the catheter tip just inside the right atrium and images were retained of the final catheter tip position, as well as the course of the catheter through the base of the right neck.  Telfa island dressing was placed on the catheter exit site, and this did complete the procedure.       Of note, we had originally intended to placed a left arm fistula, but we did not realize that she had a defibrillator in place to her left chest wall through the left subclavian vein.  I think this makes the left upper extremity not optimal for fistula placement.  Will need to make plans for right arm fistula creation.  I discussed that with her, will get consent and will bring her back in the very near future for right arm fistula creation.  I did discuss this with her family.        ______________________________  MD OMEGA Christina/IRVIN  DD:  11/01/2017  Time:  05:44PM  DT:  11/02/2017  Time:  01:55PM  Job #:  [id number]

## 2022-04-30 NOTE — OP NOTE
DATE OF SURGERY:    07/11/2017    SURGEON:  Carlyle Rivera DPM    PREOPERATIVE DIAGNOSES:    1. Osteomyelitis of the left hallux.  2. Chronic nonhealing ulceration of the left hallux.    POSTOPERATIVE DIAGNOSES:    1. Osteomyelitis of the left hallux.  2. Chronic nonhealing ulceration of the left hallux.    NAME OF OPERATION:  Left hallux amputation at the MPJ level.    ANESTHESIA:  Local with monitored anesthesia care.    HEMOSTASIS:  Pneumatic ankle tourniquet.    ESTIMATED BLOOD LOSS:  Minimal.    MATERIALS:  None.    INJECTABLES:  10 cc of a 1:1 mixture of 1% lidocaine plain and 0.5% Marcaine plain.    PROCEDURE IN DETAIL:  Under mild sedation, the patient was brought into the operating room and placed on the operating table in a supine position.  A pneumatic ankle tourniquet was then placed about the patient's left ankle.  Following IV sedation, local anesthesia was obtained about the left foot utilizing 10 cc of a 1:1 mixture of 1% lidocaine plain and 0.5% Marcaine plain.  The foot was then scrubbed, prepped and draped in the usual aseptic manner.  At this time, no Esmarch and tourniquet were utilized or needed for the surgery.     Attention was directed to the left hallux, at which time two converging, 3 cm, transverse, fishmouth, elliptical incisions were made at the MPJ level which were carried down to bone.  The incisions were then created into a full-thickness flap back to the level of the MPJ at which time, the proximal phalanx was disarticulated at the MPJ level and the hallux was removed in toto and passed from the operative field and sent for pathology.  The wound was then flushed with copious amounts of sterile normal saline.  The wound was re-evaluated and any nonviable unhealthy or infected looking tissue was removed at this time.  Next, a thorough irrigation was performed again and closure of the skin was then performed utilizing 4-0 Monocryl and 4-0 nylon in simple interrupted suture  technique.     Upon completion of the procedure, the incision was dressed with Xeroform and covered with sterile compressive dressings consisting of 4 x 4's and Kerlix.     The patient tolerated the procedure and anesthesia well.  She was transferred to the recovery room with vital signs stable and vascular status intact to all remaining toes of the left foot.  Following a period of postoperative monitoring, the patient will be discharged home per Anesthesia.        ______________________________  BRENDEN Tamayo/ANNA  DD:  07/14/2017  Time:  07:48PM  DT:  07/17/2017  Time:  05:37AM  Job #:  [id number]

## 2022-04-30 NOTE — OP NOTE
Patient:   Loida Meng            MRN: [id number]            FIN: [id number]               Age:   67 years     Sex:  Female     :  1950   Associated Diagnoses:   Renal disease, end stage   Author:   Grisel Coto MD      Operative Note   Operative Information   Date/ Time:  2018 08:28:00.     Procedures Performed: Tunnelled Right Internal Jugular Catheter Placement, Left chest wall tunneled catheter removal.     Indications: Mrs. Meng is a 66 y/o woman with ESRD who has a left chest wall tunneled catheter and a right arm fistula that is not yet matured.  Her left chest wall defribillator is in need of replacement secondary to battery end of life.   Her cardiologist recommends moving her catheter so that her defibrillator can be changed without undue infection risk. .     Preoperative Diagnosis: Renal disease, end stage (ZLT73-NO N18.6).     Postoperative Diagnosis: Renal disease, end stage (TXS37-VX N18.6).     Surgeon: Grisel Coto MD.     Esimated blood loss: loss less than  50  cc.     Description of Procedure/Findings/    Complications: The bilateral neck and chest were prepped and draped in the usual sterile technique.  1% lidocaine was used for local anesthesia throughout the case.  Using ultrasound guidance, the right internal jugular vein was identified.  The vein was easily compressible and of good size.  An 18 gauge needle was used to access the vein.  The IJ was patent and compressible.  U/s images of access were recorded.  A wire was placed.  Dilators were placed over the wire.  A 23cm Palindrome tunnelled catheter was placed with its tip at the right atrial/SVC junction and confirmed with fluoroscopy.  The catheter was retrograde tunnelled throught a separate stab incision.  Catheter tip and course through supraclavicular area were confirmed with fluoroscopy and images were saved.  The catheter was reassembled.  It was flushed with heparinized saline and  instilled with a 1000 unit/cc heparin solution. The catheter was sutured to the skin with a silk suture.  The neck incision was closed with a vicryl suture.  Dermabond was applied at this site.  Telfa island dressing was used at the catheter exit site.    We then turned our attention to the left chest wall catheter.  1% lidocaine was injected along the tunnel tract. We then utilized blunt dissection and removed the left chest wall tunneled catheter. Hemostasis was obtained.  A pressure dressing was applied.   The patient tolerated the procedure well.       .     Findings:    , tip at RA/SVC junction   .     Complications: None.

## 2022-04-30 NOTE — ED PROVIDER NOTES
Patient:   Loida Meng            MRN: [id number]            FIN: [id number]               Age:   67 years     Sex:  Female     :  1950   Associated Diagnoses:   ESRD needing dialysis; Hemodialysis catheter malfunction   Author:   Felecia Rosales MD      Basic Information   Time seen: Date & time 2017 12:45:00.   History source: Patient.   Arrival mode: Private vehicle, wheelchair.   History limitation: None.   Additional information: Patient's physician(s): Chica GARCIA, Grisel SUBRAMANIAN, Yanira GARCIA, Surjit EDWARDS, Chief Complaint from Nursing Triage Note : Chief Complaint   2017 12:44 CST     Chief Complaint           R chest wall dialysis access graft was pulled out 2 days ago. no active bleeding noted. denies any complaints. due for dialysis today.  .      History of Present Illness   The patient presents with an indwelling line problem, SORT:  F[place]ale with vascular catheter dislodged 2 days ago and new right arm graft due for dialysis today.  Roopa KESSLER and     I, Dr. Rosales, assumed care of this patient at 1541.  68 y/o AAF with a history of chronic kidney disease, DM, CHF and HTN, presents to the ED after her right upper extremity dialysis access has become dislodged on Saturday, 17. She says that she last received dialysis on Saturday (normally Tuesday, Thursday, Saturday), and her access became dislodged later that day. Pt states that the fistula was placed 6 days ago. .  The onset was 3  days ago.  Symptoms: possible displacement.  Location: Right upper extremity. The course/duration of symptoms is constant.  Risk factors consist of diabetes mellitus and recent surgery.  Prior episodes: none.  Therapy today: none.  Associated symptoms: none.        Review of Systems   Constitutional symptoms:  No fever, no chills.    Skin symptoms:  No jaundice, no rash.    Eye symptoms:  Vision unchanged, No blurred vision,    Respiratory symptoms:  No shortness of breath, no orthopnea, no  cough, no sputum production.    Cardiovascular symptoms:  Right upper extremity dialysis access is dislodged, no chest pain, no palpitations, no diaphoresis, no peripheral edema.    Gastrointestinal symptoms:  No abdominal pain, no nausea, no vomiting, no diarrhea, no constipation.    Genitourinary symptoms:  No dysuria, no hematuria.    Neurologic symptoms:  No headache, no dizziness.    Hematologic/Lymphatic symptoms:  Bleeding tendency negative,    Allergy/immunologic symptoms:  No impaired immunity,       Health Status   Allergies:    Allergic Reactions (Selected)  Severity Not Documented  Betadine- Itching.  Hydrocodone- Unknown.  Lisinopril- Chronic cough.  Pravastatin- Chronic cough.  Seafood- Itching and hives..   Medications:  (Selected)   Inpatient Medications  Ordered  Cipro I.V. 400 mg/200 mL-D5% intravenous solution: 300 mg, form: Infusion, IV Piggyback, Pre Op, Infuse over: 1 hr, first dose 08/15/17 6:23:00 CDT, stop date 08/15/17 6:23:00 CDT, 28,063  Pending Complete  DuoNeb inhalation solution: 3 mL, form: Soln, NEB, q15min, Order duration: 3 dose(s), first dose 03/31/15 1:06:00 CDT, stop date 03/31/15 1:50:00 CDT  midazolam: 2 mg, form: Injection, IV Push, q5min, Order duration: 2 dose(s), first dose 08/15/17 7:30:00 CDT, stop date 08/15/17 7:39:00 CDT, (up to 5 mg for moderate anxiety), 30,025  midazolam: 2 mg, form: Injection, IV Push, q5min, Order duration: 2 dose(s), first dose 11/01/17 14:36:00 CDT, stop date 11/01/17 14:45:00 CDT, (up to 5 mg for moderate anxiety), 30,025  Prescriptions  Prescribed  Lexapro 10 mg oral tablet: 10 mg = 1 tab(s), Oral, Daily, # 30 tab(s), 0 Refill(s)  aspirin 81 mg oral tablet, CHEWABLE: 81 mg = 1 tab(s), Oral, Daily, # 30 tab(s), 4 Refill(s)  traZODone 100 mg oral tablet: 100 mg = 1 tab(s), Oral, At Bedtime, # 30 tab(s), 0 Refill(s)  Documented Medications  Documented  Cytomel 25 mcg oral tablet: 25 mcg = 1 tab(s), Oral, Daily, # 30 tab(s), 0 Refill(s)  Norvasc  5 mg oral tablet: 10 mg = 2 tab(s), Oral, Daily, 0 Refill(s)  Renal Caps oral capsule: 1 cap(s), Oral, Daily, 0 Refill(s)  ergocalciferol 50,000 intUnit oral capsule: Oral, qSaturday, 0 Refill(s)  insulin glargine 100 units/mL subcutaneous inj.: 15 units, Subcutaneous, At Bedtime, 0 Refill(s)  lactulose 10 g/15 mL oral syrup: 10 gm = 15 mL, Oral, Daily, 0 Refill(s)  levothyroxine 137 mcg (0.137 mg) oral capsule: 137 mcg = 1 cap(s), Oral, Daily, 0 Refill(s)  metoprolol tartrate 25 mg oral tab: 25 mg = 1 tab(s), Oral, BID, 0 Refill(s).      Past Medical/ Family/ Social History   Medical history:    Active  Diabetes ([id number]): Onset in 2008 at 57 years.  COPD (47302265): Onset in 2007 at 56 years.  CHF (congestive heart failure) ([id number]): Onset in 2007 at 56 years.  Kidney disease ([id number]): Onset in 2007 at 56 years.  Diabetes ([id number]-[id number]-170L295N57R3): Onset in 2000 at 49 years.  HTN (hypertension) ([id number]): Onset in 2000 at 49 years.  Hypothyroid ([id number]): Onset in 2000 at 49 years..   Surgical history:    Arteriovenous Fistula (Right) on 12/20/2017 at 67 Years.  Comments:  12/20/2017 18:53 - Lisa MORENO, Tala Vitale  auto-populated from documented surgical case  Catheter Insertion Palindrome (., Right, Upper) on 11/15/2017 at 67 Years.  Comments:  11/15/2017 15:27 - Zack MORENO, Devora HOLT  auto-populated from documented surgical case  Catheter Insertion Palindrome (Right) on 11/1/2017 at 67 Years.  Comments:  11/1/2017 17:13 - Emma MORENO, Luda Kelly  auto-populated from documented surgical case  Catheter Insertion Palindrome (.) on 10/5/2017 at 67 Years.  Comments:  10/5/2017 08:39 - Rosio MORENO, Maria De Jesus SUBRAMANIAN  auto-populated from documented surgical case  Central Line Insertion (None) on 8/28/2017 at 66 Years.  Comments:  8/28/2017 17:57 - Cathy MORENO ,  Corey CURRAN.  auto-populated from documented surgical case  Sigmoidoscopy on 8/25/2017 at 66 Years.  Comments:  8/25/2017 13:19 - Karthik Worthington RN  auto-populated from documented surgical case  Esophagogastroduodenoscopy on 8/23/2017 at 66 Years.  Comments:  8/23/2017 15:30 - Karthik Worthington RN  auto-populated from documented surgical case  Amputation Toe (Right) on 8/15/2017 at 66 Years.  Comments:  8/15/2017 08:52 - Apolinar Rodriguez RN  auto-populated from documented surgical case  Amputation Toe (Left) on 7/11/2017 at 66 Years.  Comments:  7/11/2017 08:37 - Boy Walters RN  auto-populated from documented surgical case  Colonoscopy on 3/3/2016 at 65 Years.  Comments:  3/3/2016 11:57 - Cynthia Porter RN  auto-populated from documented surgical case  Cataract Extraction Phacoemulsification (Left) on 4/30/2015 at 64 Years.  Comments:  4/30/2015 10:58 - Leslie So RN  auto-populated from documented surgical case  Cataract (366.9) in 2010 at 60 Years.  Comments:  4/23/2015 14:18 - Monica Carrington RN  right eye  Cholecystectomy ([id number]).  Thyroidectomy of remaining tissue ([id number]).  toe amputation x 2 - right foot.  denies weight loss surgery.  defibrillator implantation..   Family history:    Diabetes mellitus type 2  Brother  Daughter  Daughter  Cancer  Father  Mother  Liver disease  Father  Congestive heart disease.  Mother  Father  Sister  Hypertension.  Mother  Father  Sister  Alcoholism.  Father  Hyperlipidemia.  Mother  Sister  COPD (chronic obstructive pulmonary disease).  Mother  .   Social history: Alcohol use: Occasionally, Tobacco use: Quit 10 years ago, Drug use: Denies.      Physical Examination               Vital Signs   Vital Signs   12/26/2017 12:44 CST     Temperature Oral          36.5 DegC                             Temperature Oral (calculated)             97.70 DegF                             Peripheral Pulse Rate     78 bpm                             Respiratory  Rate          20 br/min                             SpO2                      99 %                             Oxygen Therapy            Room air                             Systolic Blood Pressure   105 mmHg                             Diastolic Blood Pressure  56 mmHg  LOW  .   Measurements   12/26/2017 12:44 CST     Weight Dosing             86 kg                             Weight Measured and Calculated in Lbs     189.60 lb                             Weight Estimated          86 kg  .   Basic Oxygen Information   12/26/2017 12:44 CST     SpO2                      99 %                             Oxygen Therapy            Room air  .   General:  Alert, no acute distress.    Skin:  Warm, dry.    Head:  Normocephalic, atraumatic.    Neck:  Supple, trachea midline.    Eye:  Normal conjunctiva.   Ears, nose, mouth and throat:  Oral mucosa moist.   Cardiovascular:  Regular rate and rhythm, No murmur, Normal peripheral perfusion, No edema, Fresh right upper extremity AV fistula. Positive bruit, no palpable thrill..    Respiratory:  Lungs are clear to auscultation, respirations are non-labored.    Chest wall:  Right chest wall healed, prior to AV catheter site.   Gastrointestinal:  Soft, Nontender, Non distended, Normal bowel sounds.    Neurological:  Alert and oriented to person, place, time, and situation.   Psychiatric:  Cooperative.      Medical Decision Making   Rationale:  Patient here for displaced tunnel dialysis catheter.  Due for dialysis today, last dialyzed 4 days ago.  She is asymptomatic.  Her AV fistula is only 6 days old and is not mature enough to be used.  Will discuss with vascular surgery regarding catheter placement for hemodialysis.   Documents reviewed:  Emergency department nurses' notes.   Orders  Launch Order Profile (Selected)   Inpatient Orders  Ordered  Admit in Outpatient Bedded Status: 12/26/17 16:38:00 CST, Kveen SKELTON MD, [facility], No  Discharge Planning Ongoing  Assessment: 12/29/17 9:00:00 CST, q3day  Fall Risk Protocol: 12/26/17 16:00:15 CST, Constant Order  Ordered (Dispatched)  BNP: Stat collect, 12/26/17 15:13:00 CST, Blood, Stop date 12/26/17 15:13:00 CST, Lab Collect, Print Label By Order Location, 12/26/17 15:13:00 CST  CBC w/ Auto Diff: Stat collect, 12/26/17 15:13:00 CST, Blood, Once, Stop date 12/26/17 15:13:00 CST, Lab Collect, Print Label By Order Location, 12/26/17 15:13:00 CST  PT: Stat collect, 12/26/17 15:13:00 CST, Blood, Once, Stop date 12/26/17 15:13:00 CST, Lab Collect, Print Label By Order Location, 12/26/17 15:13:00 CST  PTT: Stat collect, 12/26/17 15:13:00 CST, Blood, Once, Stop date 12/26/17 15:13:00 CST, Lab Collect, Print Label By Order Location, 12/26/17 15:13:00 CST  Ordered (In-Lab)  POC ISTAT Chem8 Request:: Blood, Stat collect, Collected, 12/26/17 16:38:00 CST by Felecia Rosales MD, Stop date 12/26/17 16:38:00 CST, Nurse collect, Print Label By Order Location  .   Results review:  Lab results : Lab View   12/26/2017 16:49 CST     POC Sodium                141 mmol/L                             POC Potassium             3.6 mmol/L                             POC Chloride              105 mmol/L                             POC Ion Calcium           1.14 mmol/L                             POC Glucose               158 mg/dL  HI                             POC BUN                   44.0 mg/dL  HI                             POC Creatinine            6.7 mg/dL  HI                             POC AGAP                  17.0  NA                             POC Hb                    14.3 mg/dL                             POC Hct                   42.0 %                             POC TCO2                  23.0 mmol/L    12/26/2017 16:46 CST     BNP                       3,872 pg/mL  HI                             PT                        15.6 second(s)  HI                             INR                       1.20                             PTT                        32.6 second(s)                             WBC                       7.5 x10(3)/mcL                             RBC                       4.65 x10(6)/mcL                             Hgb                       12.2 gm/dL                             Hct                       37.0 %                             Platelet                  176 x10(3)/mcL                             MCV                       79.6 fL  LOW                             MCH                       26.2 pg  LOW                             MCHC                      33.0 gm/dL                             RDW                       18.8 %  HI                             MPV                       9.7 fL                             Abs Neut                  5.64 x10(3)/mcL                             Neutro Auto               75 %  NA                             Lymph Auto                14 %  NA                             Mono Auto                 9 %  NA                             Eos Auto                  1 %  NA                             Abs Eos                   0.1 x10(3)/mcL                             Basophil Auto             0 %  NA                             Abs Neutro                5.64 x10(3)/mcL                             Abs Lymph                 1.1 x10(3)/mcL                             Abs Mono                  0.7 x10(3)/mcL                             Abs Baso                  0.0 x10(3)/mcL    , Interpretation Abnormal results  ESRD.       Impression and Plan   Diagnosis   ESRD needing dialysis (WNJ22-[place] N18.6)   Hemodialysis catheter malfunction ([facility]U11-ET T82.41XA)      Calls-Consults   -  Keven SKELTON MD, Camilo Wen, requests call back after labs available to determine need for catheter placement tonight vs tomorrow, 1640: Dr. Baca called back, able to take patient to the OR now for tunneled dialysis catheter.    Plan   Disposition: Admit time  12/26/2017 16:40:00, Place in Observation Unit, Keven SKELTON MD,  Camilo Wen.    Counseled: Patient, Regarding diagnosis, Regarding diagnostic results, Regarding treatment plan, Patient indicated understanding of instructions.    Notes: I, Franklyn Hahn, acted solely as a scribe for and in the presence of Dr. Rosales who performed the service. , I, Felecia Rosales, have independently performed the history, physical, medical decision making and procedures as documented above and agree with the scribe's documentation. .